# Patient Record
Sex: MALE | Race: WHITE | NOT HISPANIC OR LATINO | URBAN - METROPOLITAN AREA
[De-identification: names, ages, dates, MRNs, and addresses within clinical notes are randomized per-mention and may not be internally consistent; named-entity substitution may affect disease eponyms.]

---

## 2017-02-09 ENCOUNTER — OFFICE (OUTPATIENT)
Dept: URBAN - METROPOLITAN AREA CLINIC 33 | Facility: CLINIC | Age: 52
End: 2017-02-09

## 2017-02-09 VITALS
HEART RATE: 53 BPM | DIASTOLIC BLOOD PRESSURE: 102 MMHG | TEMPERATURE: 97.9 F | WEIGHT: 225 LBS | SYSTOLIC BLOOD PRESSURE: 157 MMHG | HEIGHT: 70 IN

## 2017-02-09 DIAGNOSIS — R10.11 RIGHT UPPER QUADRANT PAIN: ICD-10-CM

## 2017-02-09 DIAGNOSIS — R74.8 ABNORMAL LEVELS OF OTHER SERUM ENZYMES: ICD-10-CM

## 2017-02-09 DIAGNOSIS — R14.0 ABDOMINAL DISTENSION (GASEOUS): ICD-10-CM

## 2017-02-09 DIAGNOSIS — K50.014 CROHN'S DISEASE OF SMALL INTESTINE WITH ABSCESS: ICD-10-CM

## 2017-02-09 PROCEDURE — 99244 OFF/OP CNSLTJ NEW/EST MOD 40: CPT

## 2017-02-10 LAB
ACTIN (SMOOTH MUSCLE) ANTIBODY: 18 UNITS (ref 0–19)
ANA W/REFLEX: ANA DIRECT: NEGATIVE
C-REACTIVE PROTEIN, QUANT: 0.9 MG/L (ref 0–4.9)
CBC, PLATELET, NO DIFFERENTIAL: HEMATOCRIT: 44.7 % (ref 37.5–51)
CBC, PLATELET, NO DIFFERENTIAL: HEMOGLOBIN: 15.5 G/DL (ref 12.6–17.7)
CBC, PLATELET, NO DIFFERENTIAL: MCH: 32.2 PG (ref 26.6–33)
CBC, PLATELET, NO DIFFERENTIAL: MCHC: 34.7 G/DL (ref 31.5–35.7)
CBC, PLATELET, NO DIFFERENTIAL: MCV: 93 FL (ref 79–97)
CBC, PLATELET, NO DIFFERENTIAL: PLATELETS: 211 X10E3/UL (ref 150–379)
CBC, PLATELET, NO DIFFERENTIAL: RBC: 4.81 X10E6/UL (ref 4.14–5.8)
CBC, PLATELET, NO DIFFERENTIAL: RDW: 13.4 % (ref 12.3–15.4)
CBC, PLATELET, NO DIFFERENTIAL: WBC: 6.3 X10E3/UL (ref 3.4–10.8)
CELIAC DISEASE PANEL: ENDOMYSIAL ANTIBODY IGA: NEGATIVE
CELIAC DISEASE PANEL: IMMUNOGLOBULIN A, QN, SERUM: 335 MG/DL (ref 90–386)
CELIAC DISEASE PANEL: T-TRANSGLUTAMINASE (TTG) IGA: <2 U/ML
COMP. METABOLIC PANEL (14): A/G RATIO: 1.5 (ref 1.1–2.5)
COMP. METABOLIC PANEL (14): ALBUMIN, SERUM: 4.1 G/DL (ref 3.5–5.5)
COMP. METABOLIC PANEL (14): ALKALINE PHOSPHATASE, S: 58 IU/L (ref 39–117)
COMP. METABOLIC PANEL (14): ALT (SGPT): 113 IU/L — HIGH (ref 0–44)
COMP. METABOLIC PANEL (14): AST (SGOT): 93 IU/L — HIGH (ref 0–40)
COMP. METABOLIC PANEL (14): BILIRUBIN, TOTAL: 0.8 MG/DL (ref 0–1.2)
COMP. METABOLIC PANEL (14): BUN/CREATININE RATIO: 25 — HIGH (ref 9–20)
COMP. METABOLIC PANEL (14): BUN: 21 MG/DL (ref 6–24)
COMP. METABOLIC PANEL (14): CALCIUM, SERUM: 9.1 MG/DL (ref 8.7–10.2)
COMP. METABOLIC PANEL (14): CARBON DIOXIDE, TOTAL: 20 MMOL/L (ref 18–29)
COMP. METABOLIC PANEL (14): CHLORIDE, SERUM: 104 MMOL/L (ref 96–106)
COMP. METABOLIC PANEL (14): CREATININE, SERUM: 0.83 MG/DL (ref 0.76–1.27)
COMP. METABOLIC PANEL (14): EGFR IF AFRICN AM: 117 ML/MIN/1.73 (ref 59–?)
COMP. METABOLIC PANEL (14): EGFR IF NONAFRICN AM: 101 ML/MIN/1.73 (ref 59–?)
COMP. METABOLIC PANEL (14): GLOBULIN, TOTAL: 2.8 G/DL (ref 1.5–4.5)
COMP. METABOLIC PANEL (14): GLUCOSE, SERUM: 99 MG/DL (ref 65–99)
COMP. METABOLIC PANEL (14): POTASSIUM, SERUM: 4.3 MMOL/L (ref 3.5–5.2)
COMP. METABOLIC PANEL (14): PROTEIN, TOTAL, SERUM: 6.9 G/DL (ref 6–8.5)
COMP. METABOLIC PANEL (14): SODIUM, SERUM: 143 MMOL/L (ref 134–144)
HBSAG SCREEN: NEGATIVE
HCV ANTIBODY RFX TO QUANT PCR: HCV AB: >11 S/CO RATIO — HIGH
HCV RT-PCR, QUANT (NON-GRAPH): HCV LOG10: 6.52 LOG10 IU/ML
HCV RT-PCR, QUANT (NON-GRAPH): HEPATITIS C QUANTITATION: (no result) IU/ML
HCV RT-PCR, QUANT (NON-GRAPH): TEST INFORMATION: (no result)
HEP A AB, TOTAL: NEGATIVE
HEP B SURFACE AB: HEP B SURFACE AB, QUAL: NON REACTIVE
LIVER-KIDNEY MICROSOMAL AB: 1.6 UNITS (ref 0–20)
PT AND PTT: APTT: 26 SEC (ref 24–33)
PT AND PTT: INR: 1 (ref 0.8–1.2)
PT AND PTT: PROTHROMBIN TIME: 10.4 SEC (ref 9.1–12)
TSH: 0.97 UIU/ML (ref 0.45–4.5)

## 2017-02-17 LAB
MITOCHONDRIAL (M2) ANTIBODY: 21.8 UNITS — HIGH (ref 0–20)
WRITTEN AUTHORIZATION: (no result)

## 2017-02-27 ENCOUNTER — OFFICE (OUTPATIENT)
Dept: URBAN - METROPOLITAN AREA CLINIC 78 | Facility: CLINIC | Age: 52
End: 2017-02-27

## 2017-02-27 VITALS
HEART RATE: 55 BPM | SYSTOLIC BLOOD PRESSURE: 149 MMHG | TEMPERATURE: 98.1 F | HEIGHT: 70 IN | DIASTOLIC BLOOD PRESSURE: 98 MMHG | WEIGHT: 227 LBS

## 2017-02-27 DIAGNOSIS — R19.8 OTHER SPECIFIED SYMPTOMS AND SIGNS INVOLVING THE DIGESTIVE S: ICD-10-CM

## 2017-02-27 DIAGNOSIS — K50.014 CROHN'S DISEASE OF SMALL INTESTINE WITH ABSCESS: ICD-10-CM

## 2017-02-27 DIAGNOSIS — R10.13 EPIGASTRIC PAIN: ICD-10-CM

## 2017-02-27 DIAGNOSIS — R74.8 ABNORMAL LEVELS OF OTHER SERUM ENZYMES: ICD-10-CM

## 2017-02-27 DIAGNOSIS — R14.0 ABDOMINAL DISTENSION (GASEOUS): ICD-10-CM

## 2017-02-27 DIAGNOSIS — K80.80 OTHER CHOLELITHIASIS WITHOUT OBSTRUCTION: ICD-10-CM

## 2017-02-27 PROCEDURE — 99214 OFFICE O/P EST MOD 30 MIN: CPT

## 2017-02-27 PROCEDURE — 00031: CPT

## 2017-03-21 ENCOUNTER — ON CAMPUS - OUTPATIENT (OUTPATIENT)
Dept: URBAN - METROPOLITAN AREA HOSPITAL 14 | Facility: HOSPITAL | Age: 52
End: 2017-03-21
Payer: COMMERCIAL

## 2017-03-21 DIAGNOSIS — R10.84 GENERALIZED ABDOMINAL PAIN: ICD-10-CM

## 2017-03-21 DIAGNOSIS — R10.13 EPIGASTRIC PAIN: ICD-10-CM

## 2017-03-21 DIAGNOSIS — K50.00 CROHN'S DISEASE OF SMALL INTESTINE WITHOUT COMPLICATIONS: ICD-10-CM

## 2017-03-21 DIAGNOSIS — R19.8 OTHER SPECIFIED SYMPTOMS AND SIGNS INVOLVING THE DIGESTIVE S: ICD-10-CM

## 2017-03-21 PROCEDURE — 43235 EGD DIAGNOSTIC BRUSH WASH: CPT

## 2017-03-21 PROCEDURE — 43450 DILATE ESOPHAGUS 1/MULT PASS: CPT

## 2017-03-21 PROCEDURE — 45380 COLONOSCOPY AND BIOPSY: CPT

## 2017-05-10 ENCOUNTER — OFFICE (OUTPATIENT)
Dept: URBAN - METROPOLITAN AREA CLINIC 33 | Facility: CLINIC | Age: 52
End: 2017-05-10

## 2017-05-10 VITALS
DIASTOLIC BLOOD PRESSURE: 96 MMHG | SYSTOLIC BLOOD PRESSURE: 137 MMHG | HEART RATE: 67 BPM | HEIGHT: 70 IN | TEMPERATURE: 97.7 F | WEIGHT: 229 LBS

## 2017-05-10 DIAGNOSIS — R74.8 ABNORMAL LEVELS OF OTHER SERUM ENZYMES: ICD-10-CM

## 2017-05-10 DIAGNOSIS — K50.014 CROHN'S DISEASE OF SMALL INTESTINE WITH ABSCESS: ICD-10-CM

## 2017-05-10 DIAGNOSIS — R10.11 RIGHT UPPER QUADRANT PAIN: ICD-10-CM

## 2017-05-10 LAB
AFP, SERUM, TUMOR MARKER: 5.1 NG/ML (ref 0–8.3)
HCV GENOTYPING NON REFLEX: HEPATITIS C GENOTYPE: (no result)
HCV GENOTYPING NON REFLEX: PLEASE NOTE: (no result)
PROTHROMBIN TIME (PT): INR: 1 (ref 0.8–1.2)
PROTHROMBIN TIME (PT): PROTHROMBIN TIME: 10.4 SEC (ref 9.1–12)

## 2017-05-10 PROCEDURE — 99214 OFFICE O/P EST MOD 30 MIN: CPT

## 2017-05-10 RX ORDER — BUDESONIDE 3 MG/1
CAPSULE ORAL
Qty: 90 | Refills: 5 | Status: COMPLETED
Start: 2017-05-10 | End: 2018-04-10

## 2017-07-28 ENCOUNTER — OFFICE (OUTPATIENT)
Dept: URBAN - METROPOLITAN AREA CLINIC 33 | Facility: CLINIC | Age: 52
End: 2017-07-28

## 2017-07-28 VITALS
DIASTOLIC BLOOD PRESSURE: 100 MMHG | TEMPERATURE: 97.5 F | WEIGHT: 233 LBS | HEART RATE: 64 BPM | HEIGHT: 70 IN | SYSTOLIC BLOOD PRESSURE: 143 MMHG

## 2017-07-28 DIAGNOSIS — R19.7 DIARRHEA, UNSPECIFIED: ICD-10-CM

## 2017-07-28 DIAGNOSIS — B18.2 CHRONIC VIRAL HEPATITIS C: ICD-10-CM

## 2017-07-28 PROCEDURE — 99215 OFFICE O/P EST HI 40 MIN: CPT

## 2017-07-28 NOTE — SERVICEHPINOTES
53 yo male presents for f/u Hep C and ileal Crohn's. He has GT 1a and is formerly treatment-experienced with both Peg/RBV and Peg/RBV/Victrelis(?) in past. Reportedly had cure with triple therapy (though required full 48 weeks, finished January 2013). Denies risk factors for re-exposure to Hep C in the interim but does have ileal Crohn's (since at least 2010) and has taken courses of steroids in past. For his Hep C, he is currently on Harvoni - started 6/15 and is on week 6. He is taking it with omeprazole 20 mg on empty stomach. He does have h/o stage 3 fibrosis on liver biopsy in [facility] in 2007 and he had esophageal varices in 2011. A biopsy at [facility] this year showed "stage 3+" fibrosis.  Patient also has ileal Crohn's with reported h/o abscess in past and has been treated with prednisone several times in past as well as mesalamines. Over past few years has not been on any chronic Crohn's medications. Recent colonoscopy showed ileal ulcerations though pathology ok. CT enterography suggestive of ileal Crohn's. He was taking Entocort at 9mg daily dosage -started earlier this year but stopped it prior to starting Harvoni. He reports chronic diarrhea, can see food particles at times and can have 6+ BMs per day. Celiac panel negative. Stopped smoking and drinking alcohol in Feb 2017.7/14/17 week 4 labs: plt 222, AST/ALT 26/28, HCV RNA not detectedBR5/10/17 Hep C GT 1a, AFP 5.1 BR2/10/17 plt 211, AST/ALT 93/113, INR 1.0, HCV RNA 3.32 mill IU/ml, HBsAg neg, HBsAb neg, Hep A ab tot neg, CRP 0.9, celiac panel neg, DIONE neg, AMA borderline pos, ASMA neg

## 2017-09-06 LAB
CALPROTECTIN, FECAL: 252 UG/G — HIGH (ref 0–120)
OVA + PARASITE EXAM: (no result)
PANCREATIC ELASTASE, FECAL: >500 UG ELAST./G
REQUEST PROBLEM: (no result)

## 2017-09-07 LAB
CALPROTECTIN, FECAL: (no result) UG/G
OVA + PARASITE EXAM: (no result)
PANCREATIC ELASTASE, FECAL: (no result) UG ELAST./G
REQUEST PROBLEM: (no result)
RESULT: RESULT 1: (no result)

## 2017-10-24 ENCOUNTER — OFFICE (OUTPATIENT)
Dept: URBAN - METROPOLITAN AREA CLINIC 33 | Facility: CLINIC | Age: 52
End: 2017-10-24

## 2017-10-24 VITALS
WEIGHT: 235 LBS | HEIGHT: 70 IN | TEMPERATURE: 97.2 F | HEART RATE: 61 BPM | DIASTOLIC BLOOD PRESSURE: 95 MMHG | SYSTOLIC BLOOD PRESSURE: 151 MMHG

## 2017-10-24 DIAGNOSIS — K50.00 CROHN'S DISEASE OF SMALL INTESTINE WITHOUT COMPLICATIONS: ICD-10-CM

## 2017-10-24 DIAGNOSIS — B18.2 CHRONIC VIRAL HEPATITIS C: ICD-10-CM

## 2017-10-24 DIAGNOSIS — K76.0 FATTY (CHANGE OF) LIVER, NOT ELSEWHERE CLASSIFIED: ICD-10-CM

## 2017-10-24 DIAGNOSIS — R19.7 DIARRHEA, UNSPECIFIED: ICD-10-CM

## 2017-10-24 DIAGNOSIS — R91.1 SOLITARY PULMONARY NODULE: ICD-10-CM

## 2017-10-24 PROCEDURE — 99215 OFFICE O/P EST HI 40 MIN: CPT

## 2017-10-24 NOTE — SERVICEHPINOTES
53 yo male presents for f/u Hep C and ileal Crohn's. He has Hep C GT 1a and is formerly treatment-experienced with both Peg/RBV and Peg/RBV/Victrelis(?) in past. Reportedly had cure with triple therapy (though required full 48 weeks, finished January 2013). Denies risk factors for re-exposure to Hep C in the interim. He is is currently on Harvoni - started 6/15 and is on week and we were able to get this therapy extended to 24 weeks based on his history. He is taking it with omeprazole 20 mg on empty stomach. He does have h/o stage 3 fibrosis on liver biopsy in [facility] in 2007 and he had esophageal varices in 2011. A biopsy at [facility] this year showed "stage 3+" fibrosis. He had Fibroscan on 8/8/17 showing stage 3 fibrosis, stage 2 steatosis. Patient also has ileal Crohn's (since at least 2010) with reported h/o abscess in past and has been treated with prednisone several times in past as well as mesalamines. Over past few years has not been on any chronic Crohn's medications. Recent colonoscopy showed ileal ulcerations though pathology ok. CT enterography suggestive of ileal Crohn's. He was taking Entocort at 9mg daily dosage -started earlier this year but stopped it prior to starting Harvoni. He reports chronic diarrhea, can see food particles at times and can have 6+ BMs per day. Celiac panel negative. Recent stool testing with elevated fecal calpro, normal pancreatic elastase, neg O&ampP. His weight is stable since seen in July.  His CT enterography in Feb showed incidental lung lesion - appearance of granuloma with f/u CT advised for 6-12 months.Stopped smoking and drinking alcohol in Feb 2017.10/4/17 HCV RNA not detectedBR9/5/17 fecal calpro 252, pancreatic elastase >500, O&ampPx3 negBR9/11/17 Fibrosure indeterminate, AST/ALT 27/25, creat 0.83, iron sat 27, TPMT activity wnl, HgbA1C 5.1, Vit D 41.8, Quant TB neg, Vit B12 574, insulin 16.9, glucose 94, ferritin 113, HBcAb tot neg, Vit A 48BR7/14/17 week 4 labs: plt 222, AST/ALT 26/28, HCV RNA not detectedBR5/10/17 Hep C GT 1a, AFP 5.1 BR2/10/17 plt 211, AST/ALT 93/113, INR 1.0, HCV RNA 3.32 mill IU/ml, HBsAg neg, HBsAb neg, Hep A ab tot neg, CRP 0.9, celiac panel neg, DIONE neg, AMA borderline pos, ASMA neg

## 2018-01-02 ENCOUNTER — OFFICE (OUTPATIENT)
Dept: URBAN - METROPOLITAN AREA CLINIC 33 | Facility: CLINIC | Age: 53
End: 2018-01-02

## 2018-01-02 VITALS
HEIGHT: 70 IN | DIASTOLIC BLOOD PRESSURE: 103 MMHG | DIASTOLIC BLOOD PRESSURE: 99 MMHG | WEIGHT: 240 LBS | SYSTOLIC BLOOD PRESSURE: 143 MMHG | TEMPERATURE: 97.5 F | SYSTOLIC BLOOD PRESSURE: 152 MMHG | HEART RATE: 65 BPM

## 2018-01-02 DIAGNOSIS — K50.00 CROHN'S DISEASE OF SMALL INTESTINE WITHOUT COMPLICATIONS: ICD-10-CM

## 2018-01-02 DIAGNOSIS — B18.2 CHRONIC VIRAL HEPATITIS C: ICD-10-CM

## 2018-01-02 DIAGNOSIS — R91.1 SOLITARY PULMONARY NODULE: ICD-10-CM

## 2018-01-02 DIAGNOSIS — K76.0 FATTY (CHANGE OF) LIVER, NOT ELSEWHERE CLASSIFIED: ICD-10-CM

## 2018-01-02 PROCEDURE — 99214 OFFICE O/P EST MOD 30 MIN: CPT

## 2018-01-02 NOTE — SERVICEHPINOTES
51 yo male presents for f/u Hep C and ileal Crohn's. He has Hep C GT 1a and is formerly treatment-experienced with both Peg/RBV and Peg/RBV/Victrelis(?) in past. Reportedly had cure with triple therapy (though required full 48 weeks, finished January 2013). Denies risk factors for re-exposure to Hep C in the interim. He took 24 weeks of Harvoni - finished on Nov 29th. He took it with omeprazole 20 mg on empty stomach. He does have h/o stage 3 fibrosis on liver biopsy in [facility] in 2007 and he had esophageal varices in 2011. A biopsy at [facility] this year showed "stage 3+" fibrosis. He had Fibroscan on 8/8/17 showing stage 3 fibrosis, stage 2 steatosis. Patient also has ileal Crohn's (since at least 2010) with reported h/o abscess in past and has been treated with prednisone several times in past as well as mesalamines. Over past few years has not been on any chronic Crohn's medications. His colonoscopy in 2017 showed ileal ulcerations though pathology ok. CT enterography suggestive of ileal Crohn's. He was taking Entocort at 9mg daily dosage -started earlier 2017 but stopped it prior to starting Harvoni in June. He reports chronic diarrhea, can see food particles at times and can have 6+ BMs per day. Celiac panel negative. Recent stool testing with elevated fecal calpro, normal pancreatic elastase, neg O&ampP. His weight is stable/increasing.He called in with Crohn's flare on 12/15 - started on prednisone taper which started helping quickly. He has a couple days left of the taper. He has Humira on hand but developed URI on 12/25 so hasn't started it yet. Does still have a cough but no fever and is feeling better overall.  His CT enterography in Feb 2017 showed incidental lung lesion - appearance of granuloma with f/u CT advised for 6-12 months - had a f/u in Oct 2017 - stable repeat in Oct 2018.Stopped smoking and drinking alcohol in Feb 2017.11/21/17 end of tx: plt 251, AST/ALT 17/17, HCV RNA not [id number]/4/17 HCV RNA not detectedBR9/5/17 fecal calpro 252, pancreatic elastase >500, O&ampPx3 negBR9/11/17 Fibrosure indeterminate, AST/ALT 27/25, creat 0.83, iron sat 27, TPMT activity wnl, HgbA1C 5.1, Vit D 41.8, Quant TB neg, Vit B12 574, insulin 16.9, glucose 94, ferritin 113, HBcAb tot neg, Vit A 48BR7/14/17 week 4 labs: plt 222, AST/ALT 26/28, HCV RNA not detectedBR5/10/17 Hep C GT 1a, AFP 5.1 BR2/10/17 plt 211, AST/ALT 93/113, INR 1.0, HCV RNA 3.32 mill IU/ml, HBsAg neg, HBsAb neg, Hep A ab tot neg, CRP 0.9, celiac panel neg, DIONE neg, AMA borderline pos, ASMA neg

## 2018-04-10 ENCOUNTER — OFFICE (OUTPATIENT)
Dept: URBAN - METROPOLITAN AREA CLINIC 33 | Facility: CLINIC | Age: 53
End: 2018-04-10

## 2018-04-10 VITALS
SYSTOLIC BLOOD PRESSURE: 141 MMHG | SYSTOLIC BLOOD PRESSURE: 142 MMHG | TEMPERATURE: 97.3 F | DIASTOLIC BLOOD PRESSURE: 96 MMHG | DIASTOLIC BLOOD PRESSURE: 99 MMHG | WEIGHT: 243 LBS | HEIGHT: 70 IN | HEART RATE: 58 BPM

## 2018-04-10 DIAGNOSIS — K76.0 FATTY (CHANGE OF) LIVER, NOT ELSEWHERE CLASSIFIED: ICD-10-CM

## 2018-04-10 DIAGNOSIS — Z86.19 PERSONAL HISTORY OF OTHER INFECTIOUS AND PARASITIC DISEASES: ICD-10-CM

## 2018-04-10 DIAGNOSIS — R91.1 SOLITARY PULMONARY NODULE: ICD-10-CM

## 2018-04-10 DIAGNOSIS — K50.00 CROHN'S DISEASE OF SMALL INTESTINE WITHOUT COMPLICATIONS: ICD-10-CM

## 2018-04-10 PROCEDURE — 99215 OFFICE O/P EST HI 40 MIN: CPT

## 2018-04-10 NOTE — SERVICEHPINOTES
51 yo male presents for f/u Hep C and ileal Crohn's. He is s/p Harvoni x 24 weeks - finished in late November with recent labs c/w cure. He had Hep C GT 1a and was formerly treatment-experienced with both Peg/RBV and Peg/RBV/Victrelis(?) in past. Reportedly had cure with triple therapy (though required full 48 weeks, finished January 2013) but became positive again. Denies risk factors for re-exposure to Hep C in the interim. He does have h/o stage 3 fibrosis on liver biopsy in [facility] in 2007 and he had esophageal varices in 2011. A biopsy at [facility] in 2017 showed "stage 3+" fibrosis. He had Fibroscan on 8/8/17 showing stage 3 fibrosis, moderate steatosis. Patient also has ileal Crohn's (since at least 2010) with reported h/o abscess in past and has been treated with prednisone several times in past as well as mesalamines. Over past few years has not been on any chronic Crohn's medications. His colonoscopy in 2017 showed ileal ulcerations though pathology ok. CT enterography suggestive of ileal Crohn's. He was taking Entocort at 9mg daily dosage -started earlier 2017 but stopped it prior to starting Harvoni in June. He started Humira in January and recent fecal calpro level is normalized. He had a lot of diarrhea, bloating and abdominal pain in January which improved significantly with Xifaxan. He still has 4-8 BMs per day which is a chronic issue. Prior celiac panel negative. Stool testing in 2017 showed normal pancreatic elastase, neg O&ampP. His weight is stable/increasing.His CT enterography in Feb 2017 showed incidental lung lesion - appearance of granuloma with f/u CT advised for 6-12 months - had a f/u in Oct 2017 - stable repeat in Oct 2018.Stopped smoking and drinking alcohol in Feb 2017.4/3/18 WBC 7.1, Hgb 15.5, plt 235, AST/ALT 35/39, HCV RNA not detected, AMA neg, Vit A 39.3, Vit D 55, fecal calpro 24BR11/21/17 end of tx: plt 251, AST/ALT 17/17, HCV RNA not [id number]/4/17 HCV RNA not detectedBR9/5/17 fecal calpro 252, pancreatic elastase >500, O&ampPx3 negBR9/11/17 Fibrosure indeterminate, AST/ALT 27/25, creat 0.83, iron sat 27, TPMT activity wnl, HgbA1C 5.1, Vit D 41.8, Quant TB neg, Vit B12 574, insulin 16.9, glucose 94, ferritin 113, HBcAb tot neg, Vit A 48BR7/14/17 week 4 labs: plt 222, AST/ALT 26/28, HCV RNA not detectedBR5/10/17 Hep C GT 1a, AFP 5.1 BR2/10/17 plt 211, AST/ALT 93/113, INR 1.0, HCV RNA 3.32 mill IU/ml, HBsAg neg, HBsAb neg, Hep A ab tot neg, CRP 0.9, celiac panel neg, DIONE neg, AMA borderline pos, ASMA neg

## 2019-01-15 ENCOUNTER — OFFICE (OUTPATIENT)
Dept: URBAN - METROPOLITAN AREA CLINIC 33 | Facility: CLINIC | Age: 54
End: 2019-01-15

## 2019-01-15 VITALS
DIASTOLIC BLOOD PRESSURE: 92 MMHG | HEIGHT: 70 IN | HEART RATE: 64 BPM | TEMPERATURE: 97.3 F | DIASTOLIC BLOOD PRESSURE: 97 MMHG | SYSTOLIC BLOOD PRESSURE: 138 MMHG | WEIGHT: 247 LBS | SYSTOLIC BLOOD PRESSURE: 136 MMHG

## 2019-01-15 DIAGNOSIS — R19.7 DIARRHEA, UNSPECIFIED: ICD-10-CM

## 2019-01-15 DIAGNOSIS — K76.0 FATTY (CHANGE OF) LIVER, NOT ELSEWHERE CLASSIFIED: ICD-10-CM

## 2019-01-15 DIAGNOSIS — Z86.19 PERSONAL HISTORY OF OTHER INFECTIOUS AND PARASITIC DISEASES: ICD-10-CM

## 2019-01-15 DIAGNOSIS — K50.00 CROHN'S DISEASE OF SMALL INTESTINE WITHOUT COMPLICATIONS: ICD-10-CM

## 2019-01-15 PROCEDURE — 99215 OFFICE O/P EST HI 40 MIN: CPT

## 2019-01-15 RX ORDER — RIFAXIMIN 550 MG/1
TABLET ORAL
Qty: 42 | Refills: 0 | Status: COMPLETED
Start: 2019-01-15 | End: 2019-08-13

## 2019-01-15 NOTE — SERVICEHPINOTES
54 yo male presents for f/u ileal Crohn's (since at least 2010). Has reported h/o abscess in remote past and has been treated with prednisone several times in past as well as mesalamines. Over past few years was not on any chronic Crohn's medications. His colonoscopy in 2017 showed ileal ulcerations though pathology ok. CT enterography suggestive of ileal Crohn's. He started Humira in January 2018 and f/u fecal calpro level was normalized. He had a lot of diarrhea, bloating and abdominal pain in January 2018 which improved significantly with Xifaxan. Pain resolved after that and has not recurred. Today he reports that he stopped his Humira sometime in October 2018 because he didn't feel it was helping him and he didn't want to be immunosuppressed. He has had chronic diarrhea for a long time. He continues to have 3-6 BMs per day. (Symptoms may have improved transiently in past on budesonide and Xifaxan). Currently he reports that his diarrhea seems a little better on probiotics which he feels are working better than Humira.  Prior celiac panel negative. Stool testing in 2017 showed normal pancreatic elastase, neg O&ampP. His weight is stable/increasing.Stopped smoking and drinking alcohol in Feb 2017. He is s/p Harvoni x 24 weeks - finished in late November 2017 and had f/u labs c/w cure. He had Hep C GT 1a and was formerly treatment-experienced with both Peg/RBV and Peg/RBV/Victrelis(?) in past. Reportedly had cure with triple therapy (though required full 48 weeks, finished January 2013) but became positive again. Denies risk factors for re-exposure to Hep C in the interim. He does have h/o stage 3 fibrosis on liver biopsy in [place] back in 2007 and he had esophageal varices in 2011. A biopsy at [facility] in 2017 showed "stage 3+" fibrosis. He had Fibroscan on 8/8/17 showing stage 3 fibrosis, moderate steatosis. EGD in 2017 was negative for varices.9/26/18 CBC, CMP ok, TB quant NegBR4/3/18 WBC 7.1, Hgb 15.5, plt 235, AST/ALT 35/39, HCV RNA not detected, AMA neg, Vit A 39.3, Vit D 55, fecal calpro 24BR11/21/17 end of tx: plt 251, AST/ALT 17/17, HCV RNA not [id number]/4/17 HCV RNA not detectedBR9/5/17 fecal calpro 252, pancreatic elastase >500, O&ampPx3 negBR9/11/17 Fibrosure indeterminate, AST/ALT 27/25, creat 0.83, iron sat 27, TPMT activity wnl, HgbA1C 5.1, Vit D 41.8, Quant TB neg, Vit B12 574, insulin 16.9, glucose 94, ferritin 113, HBcAb tot neg, Vit A 48BR7/14/17 week 4 labs: plt 222, AST/ALT 26/28, HCV RNA not detectedBR5/10/17 Hep C GT 1a, AFP 5.1 BR2/10/17 plt 211, AST/ALT 93/113, INR 1.0, HCV RNA 3.32 mill IU/ml, HBsAg neg, HBsAb neg, Hep A ab tot neg, CRP 0.9, celiac panel neg, DIONE neg, AMA borderline pos, ASMA neg

## 2019-08-13 ENCOUNTER — OFFICE (OUTPATIENT)
Dept: URBAN - METROPOLITAN AREA CLINIC 33 | Facility: CLINIC | Age: 54
End: 2019-08-13

## 2019-08-13 VITALS
HEIGHT: 70 IN | HEART RATE: 58 BPM | TEMPERATURE: 97 F | DIASTOLIC BLOOD PRESSURE: 108 MMHG | SYSTOLIC BLOOD PRESSURE: 155 MMHG | WEIGHT: 239 LBS

## 2019-08-13 DIAGNOSIS — K50.00 CROHN'S DISEASE OF SMALL INTESTINE WITHOUT COMPLICATIONS: ICD-10-CM

## 2019-08-13 DIAGNOSIS — Z86.19 PERSONAL HISTORY OF OTHER INFECTIOUS AND PARASITIC DISEASES: ICD-10-CM

## 2019-08-13 PROCEDURE — 99215 OFFICE O/P EST HI 40 MIN: CPT

## 2019-08-13 RX ORDER — AZATHIOPRINE 50 MG/1
TABLET ORAL
Qty: 30 | Refills: 3 | Status: COMPLETED
End: 2020-08-07

## 2019-08-13 NOTE — SERVICEHPINOTES
53 yo male presents for f/u ileal Crohn's (since at least 2010). He had called in last month due to flare-up of his Crohn's symptoms. He has been having RLQ pain and fevers. He has chronic loose stools - usually 6x/day but during this flare-up he is just having about 3 stools per day. No blood in stools. He was given a 2 week course of prednisone by his PCP (40 mg, tapered). He noticed improved symptoms on prednisone. He did a fecal calpro test after he'd already been on prednisone and it still came back elevated at 179. He is now on budesonide 9mg qAM. Symptoms gradually improving. Has stiff joints which have been worse within the past year. He notes he has been using NSAIDs frequently for headaches. Patient was on Humira from January 2018 until he stopped it on his own in October 2018 (he didn't want to be immunosuppressed). On Humira, he had normalized fecal calpro levels. His chronic diarrhea had not really changed, but is likely multifactorial given improvement on Xifaxan in the past.  Last seen in January 2019 at which time he was to have updated labs and returned for f/u but he failed to do this.Prior celiac panel negative. Stool testing in 2017 showed normal pancreatic elastase, neg O&ampP. His weight is stable/increasing.Stopped smoking and drinking alcohol in Feb 2017. He has h/o HCV and is s/p Harvoni x 24 weeks - finished in late November 2017 and had f/u labs c/w cure. He had Hep C GT 1a and was formerly treatment-experienced with both Peg/RBV and Peg/RBV/Victrelis(?) in past. Reportedly had cure with triple therapy (though required full 48 weeks, finished January 2013) but became positive again. Denies risk factors for re-exposure to Hep C in the interim. He does have h/o stage 3 fibrosis on liver biopsy in [place] back in 2007 and he had esophageal varices in 2011. A biopsy at [facility] in 2017 showed "stage 3+" fibrosis. He had Fibroscan on 8/8/17 showing stage 3 fibrosis, moderate steatosis. EGD in 2017 was negative for varices.9/26/18 CBC, CMP ok, TB quant NegBR4/3/18 WBC 7.1, Hgb 15.5, plt 235, AST/ALT 35/39, HCV RNA not detected, AMA neg, Vit A 39.3, Vit D 55, fecal calpro 24BR11/21/17 end of tx: plt 251, AST/ALT 17/17, HCV RNA not [id number]/4/17 HCV RNA not detectedBR9/5/17 fecal calpro 252, pancreatic elastase >500, O&ampPx3 negBR9/11/17 Fibrosure indeterminate, AST/ALT 27/25, creat 0.83, iron sat 27, TPMT activity wnl, HgbA1C 5.1, Vit D 41.8, Quant TB neg, Vit B12 574, insulin 16.9, glucose 94, ferritin 113, HBcAb tot neg, Vit A 48BR7/14/17 week 4 labs: plt 222, AST/ALT 26/28, HCV RNA not detectedBR5/10/17 Hep C GT 1a, AFP 5.1 BR2/10/17 plt 211, AST/ALT 93/113, INR 1.0, HCV RNA 3.32 mill IU/ml, HBsAg neg, HBsAb neg, Hep A ab tot neg, CRP 0.9, celiac panel neg, DIONE neg, AMA borderline pos, ASMA neg

## 2019-10-04 ENCOUNTER — OFFICE (OUTPATIENT)
Dept: URBAN - METROPOLITAN AREA CLINIC 33 | Facility: CLINIC | Age: 54
End: 2019-10-04
Payer: COMMERCIAL

## 2019-10-04 VITALS
HEART RATE: 70 BPM | DIASTOLIC BLOOD PRESSURE: 78 MMHG | HEIGHT: 70 IN | SYSTOLIC BLOOD PRESSURE: 138 MMHG | WEIGHT: 237 LBS | TEMPERATURE: 97.3 F

## 2019-10-04 DIAGNOSIS — B18.2 CHRONIC VIRAL HEPATITIS C: ICD-10-CM

## 2019-10-04 DIAGNOSIS — K50.00 CROHN'S DISEASE OF SMALL INTESTINE WITHOUT COMPLICATIONS: ICD-10-CM

## 2019-10-04 PROCEDURE — 99215 OFFICE O/P EST HI 40 MIN: CPT

## 2019-10-04 NOTE — SERVICEHPINOTES
53 yo male presents for f/u ileal Crohn's (since at least 2010). He started on azathioprine 50 mg around 8/14/19 and he started back on Humira (loading dosing) on 8/28/19. He stopped his budesonide around that time. He just had his first 40 mg dosage of Humira last week and is on q2 week dosing currently. He is feeling much better overall and has had some semi-formed stools. His weight is stable. Clinical history: Patient had called us in July due to flare-up of his Crohn's symptoms - was having RLQ pain and fevers. He has chronic loose stools - usually 6x/day but during this flare-up he was just having about 3 stools per day. No blood in stools. He was given a 2 week course of prednisone by his PCP (40 mg, tapered). He noticed improved symptoms on prednisone. He did a fecal calpro test after he'd already been on prednisone and it still came back elevated at 179. He was then on budesonide 9mg qAM when seen in the office on 8/13. Symptoms were gradually improving.  Patient was on Humira from January 2018 until he stopped it on his own in October 2018 (he didn't want to be immunosuppressed). On Humira, he had normalized fecal calpro levels. His chronic diarrhea had not really changed, but is likely multifactorial given improvement on Xifaxan in the past. Otherwise he was last seen in January 2019 at which time he was to have updated labs and returned for f/u but he failed to do this.Prior celiac panel negative. Stool testing in 2017 showed normal pancreatic elastase, neg O&ampP. His weight is stable/increasing.Stopped smoking and drinking alcohol in Feb 2017. He has h/o HCV and is s/p Harvoni x 24 weeks - finished in late November 2017 and had f/u labs c/w cure. He had Hep C GT 1a and was formerly treatment-experienced with both Peg/RBV and Peg/RBV/Victrelis(?) in past. Reportedly had cure with triple therapy (though required full 48 weeks, finished January 2013) but became positive again. Denies risk factors for re-exposure to Hep C in the interim. He does have h/o stage 3 fibrosis on liver biopsy in [place] back in 2007 and he had esophageal varices in 2011. A biopsy at [facility] in 2017 showed "stage 3+" fibrosis. He had Fibroscan on 8/8/17 showing stage 3 fibrosis, moderate steatosis. EGD in 2017 was negative for varices.8/14/19 WBC 10.5, Hgb 13.7, MCV 92, plt 286, AST/ALT 18/23, iron sat 9, TIBC 313, HCV RNA not detected, Quant TB neg, HBsAb pos/immune, Vit B12 >2000, ferritin 157, HBsAg neg, CRP 18BR9/26/18 CBC, CMP ok, TB quant NegBR4/3/18 WBC 7.1, Hgb 15.5, plt 235, AST/ALT 35/39, HCV RNA not detected, AMA neg, Vit A 39.3, Vit D 55, fecal calpro 24BR11/21/17 end of tx: plt 251, AST/ALT 17/17, HCV RNA not [id number]/4/17 HCV RNA not detectedBR9/5/17 fecal calpro 252, pancreatic elastase >500, O&ampPx3 negBR9/11/17 Fibrosure indeterminate, AST/ALT 27/25, creat 0.83, iron sat 27, TPMT activity wnl, HgbA1C 5.1, Vit D 41.8, Quant TB neg, Vit B12 574, insulin 16.9, glucose 94, ferritin 113, HBcAb tot neg, Vit A 48BR7/14/17 week 4 labs: plt 222, AST/ALT 26/28, HCV RNA not detectedBR5/10/17 Hep C GT 1a, AFP 5.1 BR2/10/17 plt 211, AST/ALT 93/113, INR 1.0, HCV RNA 3.32 mill IU/ml, HBsAg neg, HBsAb neg, Hep A ab tot neg, CRP 0.9, celiac panel neg, DIONE neg, AMA borderline pos, ASMA neg

## 2020-02-10 ENCOUNTER — OFFICE (OUTPATIENT)
Dept: URBAN - METROPOLITAN AREA CLINIC 101 | Facility: CLINIC | Age: 55
End: 2020-02-10
Payer: COMMERCIAL

## 2020-02-10 DIAGNOSIS — B18.2 CHRONIC VIRAL HEPATITIS C: ICD-10-CM

## 2020-02-10 PROCEDURE — 91200 LIVER ELASTOGRAPHY: CPT

## 2020-02-21 ENCOUNTER — OFFICE (OUTPATIENT)
Dept: URBAN - METROPOLITAN AREA CLINIC 33 | Facility: CLINIC | Age: 55
End: 2020-02-21
Payer: COMMERCIAL

## 2020-02-21 VITALS
HEART RATE: 65 BPM | DIASTOLIC BLOOD PRESSURE: 112 MMHG | WEIGHT: 248 LBS | SYSTOLIC BLOOD PRESSURE: 158 MMHG | HEIGHT: 70 IN | TEMPERATURE: 97.2 F

## 2020-02-21 DIAGNOSIS — K76.0 FATTY (CHANGE OF) LIVER, NOT ELSEWHERE CLASSIFIED: ICD-10-CM

## 2020-02-21 DIAGNOSIS — Z86.19 PERSONAL HISTORY OF OTHER INFECTIOUS AND PARASITIC DISEASES: ICD-10-CM

## 2020-02-21 DIAGNOSIS — K50.00 CROHN'S DISEASE OF SMALL INTESTINE WITHOUT COMPLICATIONS: ICD-10-CM

## 2020-02-21 PROCEDURE — 99214 OFFICE O/P EST MOD 30 MIN: CPT

## 2020-02-21 NOTE — SERVICEHPINOTES
55 yo male presents for f/u ileal Crohn's (since at least 2010). He started on azathioprine 50 mg around 8/14/19 and he started back on Humira (loading dosing) on 8/28/19. He stopped his budesonide around that time. Humira levels were checked in October and were low so he increased dosing to once weekly and had f/u testing with improved levels. He feels better on this and has been doing well. He had recent f/u Fibroscan due to fatty liver and h/o Hep C but states there seemed to be a lot of difficulty getting the readings and he had also drank coffee probably just under 3 hours prior to the test. Results were much worse than last Fibroscan and he is wondering about repeating this in [place] where he had his last one. He has gained more weight since last seen. Wants to focus on weight loss efforts but hasn't been able to yet.Recent updated U/S shows fatty liver, no liver lesion.Prior history: BRStopped smoking and drinking alcohol in Feb 2017. He has h/o HCV and is s/p Harvoni x 24 weeks - finished in late November 2017 and had f/u labs c/w cure. He had Hep C GT 1a and was formerly treatment-experienced with both Peg/RBV and Peg/RBV/Victrelis(?) in past. Reportedly had cure with triple therapy (though required full 48 weeks, finished January 2013) but became positive again. Denies risk factors for re-exposure to Hep C in the interim. He does have h/o stage 3 fibrosis on liver biopsy in [place] back in 2007 and he had esophageal varices in 2011. A biopsy at [facility] in 2017 showed "stage 3+" fibrosis. He had Fibroscan on 8/8/17 showing stage 3 fibrosis, moderate steatosis. EGD in 2017 was negative for varices.8/14/19 WBC 10.5, Hgb 13.7, MCV 92, plt 286, AST/ALT 18/23, iron sat 9, TIBC 313, HCV RNA not detected, Quant TB neg, HBsAb pos/immune, Vit B12 >2000, ferritin 157, HBsAg neg, CRP 18BR9/26/18 CBC, CMP ok, TB quant NegBR4/3/18 WBC 7.1, Hgb 15.5, plt 235, AST/ALT 35/39, HCV RNA not detected, AMA neg, Vit A 39.3, Vit D 55, fecal calpro 24BR11/21/17 end of tx: plt 251, AST/ALT 17/17, HCV RNA not [id number]/4/17 HCV RNA not detectedBR9/5/17 fecal calpro 252, pancreatic elastase >500, O&ampPx3 negBR9/11/17 Fibrosure indeterminate, AST/ALT 27/25, creat 0.83, iron sat 27, TPMT activity wnl, HgbA1C 5.1, Vit D 41.8, Quant TB neg, Vit B12 574, insulin 16.9, glucose 94, ferritin 113, HBcAb tot neg, Vit A 48BR7/14/17 week 4 labs: plt 222, AST/ALT 26/28, HCV RNA not detectedBR5/10/17 Hep C GT 1a, AFP 5.1 BR2/10/17 plt 211, AST/ALT 93/113, INR 1.0, HCV RNA 3.32 mill IU/ml, HBsAg neg, HBsAb neg, Hep A ab tot neg, CRP 0.9, celiac panel neg, DIONE neg, AMA borderline pos, ASMA neg

## 2020-08-07 ENCOUNTER — OFFICE (OUTPATIENT)
Dept: URBAN - METROPOLITAN AREA CLINIC 34 | Facility: CLINIC | Age: 55
End: 2020-08-07

## 2020-08-07 VITALS
TEMPERATURE: 97.8 F | SYSTOLIC BLOOD PRESSURE: 128 MMHG | HEART RATE: 70 BPM | HEIGHT: 70 IN | WEIGHT: 249 LBS | DIASTOLIC BLOOD PRESSURE: 72 MMHG

## 2020-08-07 DIAGNOSIS — K50.00 CROHN'S DISEASE OF SMALL INTESTINE WITHOUT COMPLICATIONS: ICD-10-CM

## 2020-08-07 DIAGNOSIS — Z86.19 PERSONAL HISTORY OF OTHER INFECTIOUS AND PARASITIC DISEASES: ICD-10-CM

## 2020-08-07 DIAGNOSIS — K76.0 FATTY (CHANGE OF) LIVER, NOT ELSEWHERE CLASSIFIED: ICD-10-CM

## 2020-08-07 DIAGNOSIS — R19.5 OTHER FECAL ABNORMALITIES: ICD-10-CM

## 2020-08-07 PROCEDURE — 99214 OFFICE O/P EST MOD 30 MIN: CPT | Performed by: PHYSICIAN ASSISTANT

## 2020-08-07 RX ORDER — ZOLPIDEM TARTRATE 10 MG/1
TABLET, FILM COATED ORAL
Qty: 30 | Refills: 0 | Status: COMPLETED
Start: 2020-08-07 | End: 2023-03-28

## 2020-08-07 RX ORDER — PANCRELIPASE 36000; 180000; 114000 [USP'U]/1; [USP'U]/1; [USP'U]/1
CAPSULE, DELAYED RELEASE PELLETS ORAL
Qty: 400 | Refills: 5 | Status: COMPLETED
Start: 2020-08-07 | End: 2021-03-05

## 2020-08-07 NOTE — SERVICEHPINOTES
54 yo male presents for f/u ileal Crohn's (since at least 2010) as well as fatty liver and h/o Hep C. He started on azathioprine 50 mg around 8/14/19 (discontinued circa April 2020) and he started back on Humira (loading dosing) on 8/28/19. He stopped his budesonide around that time. Humira levels were checked in October and were low so he increased dosing to once weekly and had f/u testing with improved levels. He feels better on this and has been doing pretty well. He does have chronic loose stools, improved on Humira but has never fully resolved. Has 3-5 BMs per day. No abdominal pain or fevers. Used to have abdominal pain when Crohn's was not controlled. Has been waking up in the night due to urination and then can't get back to sleep - used to take Ambien and wants to take this again. His PCP moved away and he is in the process of getting another one. No ETOH use.  He had a f/u Fibroscan early this year due to fatty liver and h/o Hep C but states there seemed to be a lot of difficulty getting the readings and he had also drank coffee probably just under 3 hours prior to the test. Results were much worse than last Fibroscan and he is wondering about repeating this in [place] where he had his last one. His weight is stable. Hasn't been able to lose weight. Last liver U/S was done in Feb 2020 and shows fatty liver, no liver lesion.He is up to date with blood work. Prior history: BRStopped smoking and drinking alcohol in Feb 2017. He has h/o HCV and is s/p Harvoni x 24 weeks - finished in late November 2017 and had f/u labs c/w cure. He had Hep C GT 1a and was formerly treatment-experienced with both Peg/RBV and Peg/RBV/Victrelis(?) in past. Reportedly had cure with triple therapy (though required full 48 weeks, finished January 2013) but became positive again. Denies risk factors for re-exposure to Hep C in the interim. He does have h/o stage 3 fibrosis on liver biopsy in [place] back in 2007 and he had esophageal varices in 2011. A biopsy at [facility] in 2017 showed "stage 3+" fibrosis. He had Fibroscan on 8/8/17 showing stage 3 fibrosis, moderate steatosis. EGD in 2017 was negative for varices.8/14/19 WBC 10.5, Hgb 13.7, MCV 92, plt 286, AST/ALT 18/23, iron sat 9, TIBC 313, HCV RNA not detected, Quant TB neg, HBsAb pos/immune, Vit B12 >2000, ferritin 157, HBsAg neg, CRP 18BR9/26/18 CBC, CMP ok, TB quant NegBR4/3/18 WBC 7.1, Hgb 15.5, plt 235, AST/ALT 35/39, HCV RNA not detected, AMA neg, Vit A 39.3, Vit D 55, fecal calpro 24BR11/21/17 end of tx: plt 251, AST/ALT 17/17, HCV RNA not [id number]/4/17 HCV RNA not detectedBR9/5/17 fecal calpro 252, pancreatic elastase >500, O&ampPx3 negBR9/11/17 Fibrosure indeterminate, AST/ALT 27/25, creat 0.83, iron sat 27, TPMT activity wnl, HgbA1C 5.1, Vit D 41.8, Quant TB neg, Vit B12 574, insulin 16.9, glucose 94, ferritin 113, HBcAb tot neg, Vit A 48BR7/14/17 week 4 labs: plt 222, AST/ALT 26/28, HCV RNA not detectedBR5/10/17 Hep C GT 1a, AFP 5.1 BR2/10/17 plt 211, AST/ALT 93/113, INR 1.0, HCV RNA 3.32 mill IU/ml, HBsAg neg, HBsAb neg, Hep A ab tot neg, CRP 0.9, celiac panel neg, DIONE neg, AMA borderline pos, ASMA neg

## 2021-03-05 ENCOUNTER — OFFICE (OUTPATIENT)
Dept: URBAN - METROPOLITAN AREA CLINIC 34 | Facility: CLINIC | Age: 56
End: 2021-03-05
Payer: COMMERCIAL

## 2021-03-05 VITALS
WEIGHT: 258 LBS | HEIGHT: 70 IN | HEART RATE: 78 BPM | SYSTOLIC BLOOD PRESSURE: 146 MMHG | DIASTOLIC BLOOD PRESSURE: 97 MMHG | TEMPERATURE: 97 F

## 2021-03-05 DIAGNOSIS — K76.0 FATTY (CHANGE OF) LIVER, NOT ELSEWHERE CLASSIFIED: ICD-10-CM

## 2021-03-05 DIAGNOSIS — K50.00 CROHN'S DISEASE OF SMALL INTESTINE WITHOUT COMPLICATIONS: ICD-10-CM

## 2021-03-05 DIAGNOSIS — R19.5 OTHER FECAL ABNORMALITIES: ICD-10-CM

## 2021-03-05 DIAGNOSIS — Z86.19 PERSONAL HISTORY OF OTHER INFECTIOUS AND PARASITIC DISEASES: ICD-10-CM

## 2021-03-05 PROCEDURE — 99214 OFFICE O/P EST MOD 30 MIN: CPT | Performed by: PHYSICIAN ASSISTANT

## 2021-03-05 NOTE — SERVICEHPINOTES
56 yo male presents for f/u ileal Crohn's (since at least 2010) as well as fatty liver and h/o Hep C. He continues on Humira weekly he did end up interrupting this for a few weeks in December due to C-spine surgery. He took a double dose when he resumed. Feels his Crohn's is stable. He does have chronic loose stools, improved on Humira but has never fully resolved. Has 3-5 BMs per day. No abdominal pain or fevers. Used to have abdominal pain when Crohn's was not controlled.  He had a Fibroscan attempted early 2020 due to fatty liver and h/o Hep C but states there seemed to be a lot of difficulty getting the readings and he had also drank coffee probably just under 3 hours prior to the test. Results were much worse than last Fibroscan and he is wanting to repeat this in [place] where he had his last one. He has h/o stage 3 fibrosis last liver U/S was done in Feb 2020 and shows fatty liver, no liver lesion. He is avoiding ETOH. He has gained weight since last seen. Continues to want to lose weight.He is up to date with blood work. Prior history: BRStopped smoking and drinking alcohol in Feb 2017. He has h/o HCV and is s/p Harvoni x 24 weeks - finished in late November 2017 and had f/u labs c/w cure. He had Hep C GT 1a and was formerly treatment-experienced with both Peg/RBV and Peg/RBV/Victrelis(?) in past. Reportedly had cure with triple therapy (though required full 48 weeks, finished January 2013) but became positive again. Denies risk factors for re-exposure to Hep C in the interim. He does have h/o stage 3 fibrosis on liver biopsy in [place] back in 2007 and he had esophageal varices in 2011. A biopsy at [facility] in 2017 showed "stage 3+" fibrosis. He had Fibroscan on 8/8/17 showing stage 3 fibrosis, moderate steatosis. EGD in 2017 was negative for varices.BR

## 2021-09-24 ENCOUNTER — OFFICE (OUTPATIENT)
Dept: URBAN - METROPOLITAN AREA CLINIC 34 | Facility: CLINIC | Age: 56
End: 2021-09-24

## 2021-09-24 VITALS
WEIGHT: 251 LBS | DIASTOLIC BLOOD PRESSURE: 92 MMHG | HEIGHT: 70 IN | HEART RATE: 70 BPM | TEMPERATURE: 97 F | SYSTOLIC BLOOD PRESSURE: 133 MMHG

## 2021-09-24 DIAGNOSIS — K50.00 CROHN'S DISEASE OF SMALL INTESTINE WITHOUT COMPLICATIONS: ICD-10-CM

## 2021-09-24 DIAGNOSIS — K76.0 FATTY (CHANGE OF) LIVER, NOT ELSEWHERE CLASSIFIED: ICD-10-CM

## 2021-09-24 DIAGNOSIS — R19.5 OTHER FECAL ABNORMALITIES: ICD-10-CM

## 2021-09-24 PROCEDURE — 99214 OFFICE O/P EST MOD 30 MIN: CPT | Performed by: PHYSICIAN ASSISTANT

## 2021-09-24 RX ORDER — DIPHENOXYLATE HYDROCHLORIDE AND ATROPINE SULFATE 2.5; .025 MG/1; MG/1
TABLET ORAL
Qty: 90 | Refills: 3 | Status: COMPLETED
Start: 2021-09-24 | End: 2022-08-02

## 2021-09-24 NOTE — SERVICEHPINOTES
55 yo male presents for f/u ileal Crohn's (since at least 2010) as well as fatty liver and h/o Hep C. He continues on Humira weekly he did end up interrupting this for a few weeks in December due to C-spine surgery. He took a double dose when he resumed. Feels his Crohn's is stable. He does have chronic loose stools, improved on Humira but has never fully resolved. Has 3-5 BMs per day. No abdominal pain or fevers. Used to have abdominal pain when Crohn's was not controlled.He had a Fibroscan attempted early 2020 due to fatty liver and h/o Hep C but states there seemed to be a lot of difficulty getting the readings and he had also drank coffee probably just under 3 hours prior to the test. Results were much worse than last Fibroscan and had wanted to repeat this in [place] where he had his last one, but never followed through on this. However, his U/S done in April of this year included an elastography with mild-mod fibrosis predicted, more in line with his known h/o stage 3 fibrosis. He is avoiding ETOH.br
br His weight is stable, down a few pounds since last visit, though had gained at that time.He is up to date with blood work.Prior history:brStopped smoking and drinking alcohol in Feb 2017.He has h/o HCV and is s/p Harvoni x 24 weeks - finished in late November 2017 and had f/u labs c/w cure. He had Hep C GT 1a and was formerly treatment-experienced with both Peg/RBV and Peg/RBV/Victrelis(?) in past. Reportedly had cure with triple therapy (though required full 48 weeks, finished January 2013) but became positive again. Denies risk factors for re-exposure to Hep C in the interim.He does have h/o stage 3 fibrosis on liver biopsy in [place] back in 2007 and he had esophageal varices in 2011. A biopsy at [facility] in 2017 showed "stage 3+" fibrosis. He had Fibroscan on 8/8/17 showing stage 3 fibrosis, moderate steatosis. EGD in 2017 was negative for varices.

## 2021-12-16 ENCOUNTER — TELEHEALTH PROVIDED OTHER THAN IN PATIENT'S HOME (OUTPATIENT)
Dept: URBAN - METROPOLITAN AREA TELEHEALTH 7 | Facility: TELEHEALTH | Age: 56
End: 2021-12-16

## 2021-12-16 VITALS — WEIGHT: 245 LBS | HEIGHT: 70 IN

## 2021-12-16 DIAGNOSIS — R19.5 OTHER FECAL ABNORMALITIES: ICD-10-CM

## 2021-12-16 DIAGNOSIS — R21 RASH AND OTHER NONSPECIFIC SKIN ERUPTION: ICD-10-CM

## 2021-12-16 DIAGNOSIS — K76.0 FATTY (CHANGE OF) LIVER, NOT ELSEWHERE CLASSIFIED: ICD-10-CM

## 2021-12-16 DIAGNOSIS — K50.00 CROHN'S DISEASE OF SMALL INTESTINE WITHOUT COMPLICATIONS: ICD-10-CM

## 2021-12-16 PROCEDURE — 99214 OFFICE O/P EST MOD 30 MIN: CPT | Mod: 95 | Performed by: PHYSICIAN ASSISTANT

## 2021-12-16 NOTE — SERVICEHPINOTES
PATIENT VERIFIED BY DATE OF BIRTH AND NAME. Patient has been consented for this telecommunication visit.
corona
br56 yo male with ileal Crohn's (since at least 2010) presents to discuss his rash. Has had a rash for awhile, starting on his foot and has moved upward and is more generalized over his body now (not on torso currently). Has distinct erythematous circular lesions that itch and often can ooze after he scratches. He has tried topical steroids and also Medrol dose pack and also a little heavier dose of steroids - he thinks this helped somewhat. 
br
corona He has seen dermatology and had biopsies suggestive "eczematous" condition. He has upcoming patch test planned with dermatology and might be referred to allergist. 
corona jeter He notes that he was worried that Humira might be somehow a problem, so he stopped taking it a few weeks ago. Has otherwise been on weekly dosing. His latest fecal calpro in October was normal. corona jeterRay does have chronic loose stools, improved on Humira but has never fully resolved. Lomotil has been quite helpful - using  up to 6 pills a day. Previously would have 3-5 BMs per day. 
corona jeter No abdominal pain or fevers. Used to have abdominal pain when Crohn's was not controlled.
corona jeter ROS as above, otherwise negative.

## 2022-08-02 ENCOUNTER — TELEHEALTH PROVIDED IN PATIENT'S HOME (OUTPATIENT)
Dept: URBAN - METROPOLITAN AREA TELEHEALTH 3 | Facility: TELEHEALTH | Age: 57
End: 2022-08-02

## 2022-08-02 VITALS — WEIGHT: 250 LBS | HEIGHT: 70 IN

## 2022-08-02 DIAGNOSIS — K50.00 CROHN'S DISEASE OF SMALL INTESTINE WITHOUT COMPLICATIONS: ICD-10-CM

## 2022-08-02 DIAGNOSIS — R19.5 OTHER FECAL ABNORMALITIES: ICD-10-CM

## 2022-08-02 DIAGNOSIS — R21 RASH AND OTHER NONSPECIFIC SKIN ERUPTION: ICD-10-CM

## 2022-08-02 DIAGNOSIS — K76.0 FATTY (CHANGE OF) LIVER, NOT ELSEWHERE CLASSIFIED: ICD-10-CM

## 2022-08-02 PROCEDURE — 99214 OFFICE O/P EST MOD 30 MIN: CPT | Mod: 95 | Performed by: PHYSICIAN ASSISTANT

## 2022-08-02 NOTE — SERVICEHPINOTES
PATIENT VERIFIED BY DATE OF BIRTH AND NAME. Patient has been consented for this telecommunication visit.
corona
br57 yo male presents for f/u ileal Crohn's (since at least 2010). Most recently has been on Humira for his Crohn's, but he has been having a lot of skin issues which started 6 weeks after COVID vaccination, including psoriasis and has seen several providers for these issues. He called a nurse via [facility] hotline and they asked him about COVID vaccination and the nurse told him they have a team working on that issue and that he might be contacted by them. Based on all of this, he elected to stop his Humira as of early July and so far has felt there is some slow improvement in his skin. He is aware of the concern for eventual Crohn's flare-up. We had discussed on the phone last month, and he was ok with starting the process to switch to Stelara, which has not yet been approved. 
corona Alex does have chronic loose stools, improved on Humira but has never fully resolved. Lomotil has been quite helpful and he is using this daily (2-6 pills/day) and is doing well. No blood in stools. No abdominal pain or fevers. Used to have abdominal pain when Crohn's was not controlled.
br
br He has fatty liver and h/o Hep C, s/p cure. Has h/o stage 3 fibrosis. Last U/S was done with elastography in 2021 with "mild-moderate" fibrosis. He wants to wait until October for a recheck on this. ROS as above, otherwise negative.

## 2022-10-13 ENCOUNTER — OFFICE (OUTPATIENT)
Dept: URBAN - METROPOLITAN AREA CLINIC 102 | Facility: CLINIC | Age: 57
End: 2022-10-13

## 2022-10-13 DIAGNOSIS — K50.00 CROHN'S DISEASE OF SMALL INTESTINE WITHOUT COMPLICATIONS: ICD-10-CM

## 2022-10-13 PROCEDURE — 96365 THER/PROPH/DIAG IV INF INIT: CPT | Performed by: INTERNAL MEDICINE

## 2023-03-28 ENCOUNTER — TELEHEALTH PROVIDED IN PATIENT'S HOME (OUTPATIENT)
Dept: URBAN - METROPOLITAN AREA TELEHEALTH 3 | Facility: TELEHEALTH | Age: 58
End: 2023-03-28
Payer: COMMERCIAL

## 2023-03-28 VITALS — WEIGHT: 242 LBS | HEIGHT: 70 IN

## 2023-03-28 DIAGNOSIS — R21 RASH AND OTHER NONSPECIFIC SKIN ERUPTION: ICD-10-CM

## 2023-03-28 DIAGNOSIS — R19.5 OTHER FECAL ABNORMALITIES: ICD-10-CM

## 2023-03-28 DIAGNOSIS — K76.0 FATTY (CHANGE OF) LIVER, NOT ELSEWHERE CLASSIFIED: ICD-10-CM

## 2023-03-28 DIAGNOSIS — K50.00 CROHN'S DISEASE OF SMALL INTESTINE WITHOUT COMPLICATIONS: ICD-10-CM

## 2023-03-28 PROCEDURE — 99214 OFFICE O/P EST MOD 30 MIN: CPT | Mod: 95 | Performed by: PHYSICIAN ASSISTANT

## 2023-03-28 RX ORDER — RIFAXIMIN 550 MG/1
TABLET ORAL
Qty: 42 | Refills: 0 | Status: COMPLETED
Start: 2023-03-28 | End: 2023-09-21

## 2023-03-28 NOTE — SERVICEHPINOTES
PATIENT VERIFIED BY DATE OF BIRTH AND NAME. Patient has been consented for this telecommunication visit.
corona
br57 yo male presents for f/u ileal Crohn's (since at least 2010). Last summer he DC'd Humira due to a lot of skin issues and psoriasis. He started on Stelara in October 2022. He has also been put on Dupixent for his rash, as of early March - he is seeing dermatology at [facility]. Still having some issues, though rash/skin lesions seem to be changing somewhat. corona jeter He notes that he feels he needs another treatment for SIBO as it has helped in the past with abdominal pain. He's had a couple of times in the past two months with a low grade fever which resolved within 24 hours. He does have chronic loose stools, previously improved on Humira but has never fully resolved (and have not changed significantly on Stelara). Lomotil has been quite helpful and he is currently is using this a few times a week. No blood in stools. Used to have abdominal pain when Crohn's was not controlled.
corona jeter Last colonoscopy was in 2017 with 10-year recall advised. Ileal and colon biopsies were essentially negative. He has fatty liver and h/o Hep C, s/p cure. Has h/o stage 3 fibrosis. Last U/S was done with elastography in 2021 with "mild-moderate" fibrosis. ROS as above, otherwise negative.

## 2023-04-05 ENCOUNTER — OFFICE (OUTPATIENT)
Dept: URBAN - METROPOLITAN AREA CLINIC 102 | Facility: CLINIC | Age: 58
End: 2023-04-05
Payer: COMMERCIAL

## 2023-04-05 DIAGNOSIS — B18.2 CHRONIC VIRAL HEPATITIS C: ICD-10-CM

## 2023-04-05 DIAGNOSIS — K76.0 FATTY (CHANGE OF) LIVER, NOT ELSEWHERE CLASSIFIED: ICD-10-CM

## 2023-04-05 PROCEDURE — 76981 USE PARENCHYMA: CPT | Performed by: INTERNAL MEDICINE

## 2023-09-21 ENCOUNTER — TELEHEALTH PROVIDED OTHER THAN IN PATIENT'S HOME (OUTPATIENT)
Dept: URBAN - METROPOLITAN AREA TELEHEALTH 7 | Facility: TELEHEALTH | Age: 58
End: 2023-09-21
Payer: COMMERCIAL

## 2023-09-21 VITALS — HEIGHT: 70 IN | WEIGHT: 240 LBS

## 2023-09-21 DIAGNOSIS — R21 RASH AND OTHER NONSPECIFIC SKIN ERUPTION: ICD-10-CM

## 2023-09-21 DIAGNOSIS — K76.0 FATTY (CHANGE OF) LIVER, NOT ELSEWHERE CLASSIFIED: ICD-10-CM

## 2023-09-21 DIAGNOSIS — R19.5 OTHER FECAL ABNORMALITIES: ICD-10-CM

## 2023-09-21 DIAGNOSIS — K50.00 CROHN'S DISEASE OF SMALL INTESTINE WITHOUT COMPLICATIONS: ICD-10-CM

## 2023-09-21 PROCEDURE — 99215 OFFICE O/P EST HI 40 MIN: CPT | Mod: 95 | Performed by: PHYSICIAN ASSISTANT

## 2023-09-21 RX ORDER — LACTOBACIL 2/BIFIDO 1/S.THERMO 450B CELL
PACKET (EA) ORAL
Qty: 120 | Refills: 11 | Status: COMPLETED
Start: 2023-09-21 | End: 2024-04-30

## 2023-09-21 NOTE — SERVICENOTES
Patient's visit was conducted through [employer] video telecommunication. Patient consented before the start of visit as to understanding of privacy concerns, possible technological failure, and their responsibility of carrying out instructions of plan.

## 2023-09-27 LAB
CALPROTECTIN, FECAL: 60 UG/G (ref 0–120)
PDF REPORT: PDF REPORT1: (no result)
SERIAL MONITORING: PDF: (no result)
USTEKINUMAB DRUG + ANTIBODY: ANTI-USTEKINUMAB ANTIBODY: <40 NG/ML
USTEKINUMAB DRUG + ANTIBODY: USTEKINUMAB: 4.9 UG/ML

## 2024-04-30 ENCOUNTER — TELEHEALTH PROVIDED IN PATIENT'S HOME (OUTPATIENT)
Dept: URBAN - METROPOLITAN AREA TELEHEALTH 7 | Facility: TELEHEALTH | Age: 59
End: 2024-04-30
Payer: COMMERCIAL

## 2024-04-30 VITALS — HEIGHT: 70 IN | WEIGHT: 240 LBS

## 2024-04-30 DIAGNOSIS — K76.0 FATTY (CHANGE OF) LIVER, NOT ELSEWHERE CLASSIFIED: ICD-10-CM

## 2024-04-30 DIAGNOSIS — R19.5 OTHER FECAL ABNORMALITIES: ICD-10-CM

## 2024-04-30 DIAGNOSIS — K50.00 CROHN'S DISEASE OF SMALL INTESTINE WITHOUT COMPLICATIONS: ICD-10-CM

## 2024-04-30 DIAGNOSIS — R21 RASH AND OTHER NONSPECIFIC SKIN ERUPTION: ICD-10-CM

## 2024-04-30 PROCEDURE — 99215 OFFICE O/P EST HI 40 MIN: CPT | Mod: 95 | Performed by: PHYSICIAN ASSISTANT

## 2024-04-30 RX ORDER — PANCRELIPASE LIPASE, PANCRELIPASE PROTEASE, PANCRELIPASE AMYLASE 40000; 126000; 168000 [USP'U]/1; [USP'U]/1; [USP'U]/1
CAPSULE, DELAYED RELEASE ORAL
Qty: 300 | Refills: 11 | Status: ACTIVE
Start: 2024-04-30

## 2024-04-30 NOTE — SERVICENOTES
Patient was located in their home during visit., Patient's visit was conducted through [employer] video telecommunication. Patient consented before the start of visit as to understanding of privacy concerns, possible technological failure, and their responsibility of carrying out instructions of plan., I spent 25 minutes today with the patient for this telehealth visit.

## 2024-04-30 NOTE — SERVICEHPINOTES
PATIENT VERIFIED BY DATE OF BIRTH AND NAME. Patient has been consented for this telecommunication visit using [employer] application.
corona
br59 yo male presents for f/u ileal Crohn's (since at least 2010). In summer 2022 he DC'd Humira due to a lot of skin issues and psoriasis. He started on Stelara in October 2022. In March 2023, after fecal calpro was high and Stelara drug levels low, his dosing was changed to q 4 weeks. As of September 2023, fecal calpro was ok at 60 and Stelara levels good at 4.9. br
brToday he reports at least a year-long h/o issues with coughing up phlegm and spells of SOB. Has bouts at least a few times a week. Can last for a few hours during the day as well. Feels constant need to clear his throat. 
corona jeter Has not had pulmonology evaluation. Has tried albuterol inhaler (someone else's) and this has seemed to help.corona jeter Smokes occasional cigarette if he has a beer - very infrequent. 
corona jeter Can get neck pain, headaches, tinnitus, worse since neck surgery in 2020. Feels run down. 
corona jeter Still has frequent diarrhea. Has Lomotil on hand but doesn't use it much as it tends to make his "gut hurt" somewhat.He has been on Dupixent for prior rash issues, as of early March 2023 - he is seeing dermatology at [facility]. He reports doing quite well on this now - skin is much better. He is starting to wean off this - going to reduced frequency, q 3 weeks currently. Last colonoscopy was in 2017 with 10-year recall advised. Ileal and colon biopsies were essentially negative.He has fatty liver and h/o Hep C, s/p cure. Has h/o stage 3 fibrosis. Continuing to monitor with us. He had updated U/S and Fibroscan in 2023. ROS as above, otherwise negative.corona

## 2024-05-15 ENCOUNTER — OFFICE (OUTPATIENT)
Dept: URBAN - METROPOLITAN AREA CLINIC 102 | Facility: CLINIC | Age: 59
End: 2024-05-15
Payer: COMMERCIAL

## 2024-05-15 DIAGNOSIS — R74.8 ABNORMAL LEVELS OF OTHER SERUM ENZYMES: ICD-10-CM

## 2024-05-15 DIAGNOSIS — K76.0 FATTY (CHANGE OF) LIVER, NOT ELSEWHERE CLASSIFIED: ICD-10-CM

## 2024-05-15 PROCEDURE — 76981 USE PARENCHYMA: CPT | Performed by: PHYSICIAN ASSISTANT

## 2024-10-31 ENCOUNTER — TELEHEALTH PROVIDED OTHER THAN IN PATIENT'S HOME (OUTPATIENT)
Dept: URBAN - METROPOLITAN AREA TELEHEALTH 7 | Facility: TELEHEALTH | Age: 59
End: 2024-10-31
Payer: COMMERCIAL

## 2024-10-31 VITALS — HEIGHT: 70 IN | WEIGHT: 240 LBS

## 2024-10-31 DIAGNOSIS — R19.5 OTHER FECAL ABNORMALITIES: ICD-10-CM

## 2024-10-31 DIAGNOSIS — K76.0 FATTY (CHANGE OF) LIVER, NOT ELSEWHERE CLASSIFIED: ICD-10-CM

## 2024-10-31 DIAGNOSIS — K50.00 CROHN'S DISEASE OF SMALL INTESTINE WITHOUT COMPLICATIONS: ICD-10-CM

## 2024-10-31 DIAGNOSIS — R21 RASH AND OTHER NONSPECIFIC SKIN ERUPTION: ICD-10-CM

## 2024-10-31 PROCEDURE — 99214 OFFICE O/P EST MOD 30 MIN: CPT | Mod: 95 | Performed by: PHYSICIAN ASSISTANT

## 2024-10-31 RX ORDER — DIPHENOXYLATE HYDROCHLORIDE AND ATROPINE SULFATE 2.5; .025 MG/1; MG/1
TABLET ORAL
Qty: 90 | Refills: 3 | Status: ACTIVE

## 2024-10-31 RX ORDER — RIFAXIMIN 550 MG/1
TABLET ORAL
Qty: 42 | Refills: 0 | Status: ACTIVE
Start: 2024-10-31

## 2025-05-29 ENCOUNTER — TELEHEALTH PROVIDED IN PATIENT'S HOME (OUTPATIENT)
Dept: URBAN - METROPOLITAN AREA TELEHEALTH 7 | Facility: TELEHEALTH | Age: 60
End: 2025-05-29
Payer: COMMERCIAL

## 2025-05-29 VITALS — WEIGHT: 245 LBS | HEIGHT: 70 IN

## 2025-05-29 DIAGNOSIS — K50.00 CROHN'S DISEASE OF SMALL INTESTINE WITHOUT COMPLICATIONS: ICD-10-CM

## 2025-05-29 DIAGNOSIS — R21 RASH AND OTHER NONSPECIFIC SKIN ERUPTION: ICD-10-CM

## 2025-05-29 DIAGNOSIS — R19.5 OTHER FECAL ABNORMALITIES: ICD-10-CM

## 2025-05-29 DIAGNOSIS — K76.0 FATTY (CHANGE OF) LIVER, NOT ELSEWHERE CLASSIFIED: ICD-10-CM

## 2025-05-29 PROCEDURE — 99214 OFFICE O/P EST MOD 30 MIN: CPT | Mod: 95 | Performed by: PHYSICIAN ASSISTANT

## 2025-05-29 NOTE — SERVICEHPINOTES
PATIENT VERIFIED BY DATE OF BIRTH AND NAME. Patient has been consented for this telecommunication visit using [employer] application.
corona
br60 yo male presents for f/u ileal Crohn's (since at least 2010) and f/u fatty liver. Has been on Stelara since October 2022, with dosing change to q 4 weeks as of March 2023. He was previously on Humira but ultimately DC'd related to skin issues. 
corona Alex has been on Dupixent for rash issues, as of early March 2023 - he is seeing dermatology at [facility]. Takes it q 2 weeks and it had been working very well, but he reports that his rash seems to be getting worse on his legs again recently. He is using a topical steroid and has a f/u with his dermatologist in July.corona Alex felt he was having a Crohn's flare-up in mid-December so took a course of budesonide. His fecal calpro was at 104 and Stelara levels good at 6.6. br
corona He continues to have a lot of loose stools, though feels he has been doing somewhat better. Issue is chronic and he has tried a number of things. Will use Xifaxan at times - when he gets more bloating and pain - has benefit with this. Uses Lomotil PRN. Has tried Zenpep- not sure if very helpful. br
corona He has stopped smoking completely. Last colonoscopy was in 2017 with 10-year recall advised. Ileal and colon biopsies were essentially negative. He has fatty liver and h/o Hep C, s/p cure. Has h/o stage 3 fibrosis. Continuing to monitor with us. He had updated U/S in 2023. Latest Fibroscan 5/15/24 - S3, F2, improved from F3 in 2023. Prefers to not go on medication at this time. Can get neck pain, headaches, tinnitus, worse since neck surgery in 2020. Using sumatriptan PRN with good results. br
coronaDoing ok today. No other concerns. ROS as above, otherwise negative.

## 2025-07-30 ENCOUNTER — OFFICE (OUTPATIENT)
Dept: URBAN - METROPOLITAN AREA CLINIC 102 | Facility: CLINIC | Age: 60
End: 2025-07-30
Payer: COMMERCIAL

## 2025-07-30 DIAGNOSIS — K76.0 FATTY (CHANGE OF) LIVER, NOT ELSEWHERE CLASSIFIED: ICD-10-CM

## 2025-07-30 PROCEDURE — 76981 USE PARENCHYMA: CPT | Performed by: PHYSICIAN ASSISTANT
